# Patient Record
Sex: MALE | Race: WHITE | NOT HISPANIC OR LATINO | Employment: PART TIME | URBAN - METROPOLITAN AREA
[De-identification: names, ages, dates, MRNs, and addresses within clinical notes are randomized per-mention and may not be internally consistent; named-entity substitution may affect disease eponyms.]

---

## 2018-05-21 ENCOUNTER — CONVERSION ENCOUNTER (OUTPATIENT)
Dept: CARDIOLOGY | Facility: CLINIC | Age: 53
End: 2018-05-21

## 2019-06-04 VITALS
BODY MASS INDEX: 28.31 KG/M2 | OXYGEN SATURATION: 98 % | HEIGHT: 72 IN | HEART RATE: 56 BPM | WEIGHT: 209 LBS | DIASTOLIC BLOOD PRESSURE: 77 MMHG | SYSTOLIC BLOOD PRESSURE: 122 MMHG

## 2019-11-11 RX ORDER — ISOSORBIDE MONONITRATE 60 MG/1
TABLET, EXTENDED RELEASE ORAL
Qty: 30 TABLET | Refills: 0 | Status: SHIPPED | OUTPATIENT
Start: 2019-11-11 | End: 2019-12-11 | Stop reason: SDUPTHER

## 2019-12-11 RX ORDER — ISOSORBIDE MONONITRATE 60 MG/1
TABLET, EXTENDED RELEASE ORAL
Qty: 30 TABLET | Refills: 0 | Status: SHIPPED | OUTPATIENT
Start: 2019-12-11 | End: 2020-01-09

## 2019-12-18 RX ORDER — FOLIC ACID 0.8 MG
TABLET ORAL
COMMUNITY
Start: 2018-05-21

## 2019-12-18 RX ORDER — CLOPIDOGREL BISULFATE 75 MG/1
TABLET ORAL EVERY 24 HOURS
COMMUNITY
Start: 2018-05-16 | End: 2020-06-23 | Stop reason: SDUPTHER

## 2019-12-18 RX ORDER — VARENICLINE TARTRATE 1 MG/1
TABLET, FILM COATED ORAL
COMMUNITY
Start: 2018-05-21 | End: 2020-11-09

## 2019-12-18 RX ORDER — LISINOPRIL 10 MG/1
TABLET ORAL EVERY 24 HOURS
COMMUNITY
Start: 2018-05-16 | End: 2019-12-20

## 2019-12-18 RX ORDER — ASPIRIN 325 MG
TABLET ORAL EVERY 24 HOURS
COMMUNITY
Start: 2018-05-16 | End: 2021-11-11

## 2019-12-18 RX ORDER — ATORVASTATIN CALCIUM 10 MG/1
TABLET, FILM COATED ORAL EVERY 24 HOURS
Status: ON HOLD | COMMUNITY
Start: 2018-05-16 | End: 2020-02-26 | Stop reason: SDUPTHER

## 2019-12-20 ENCOUNTER — TELEPHONE (OUTPATIENT)
Dept: CARDIOLOGY | Facility: CLINIC | Age: 54
End: 2019-12-20

## 2019-12-20 ENCOUNTER — OFFICE VISIT (OUTPATIENT)
Dept: CARDIOLOGY | Facility: CLINIC | Age: 54
End: 2019-12-20

## 2019-12-20 VITALS
BODY MASS INDEX: 29.93 KG/M2 | SYSTOLIC BLOOD PRESSURE: 153 MMHG | DIASTOLIC BLOOD PRESSURE: 87 MMHG | OXYGEN SATURATION: 97 % | HEART RATE: 55 BPM | HEIGHT: 72 IN | WEIGHT: 221 LBS

## 2019-12-20 DIAGNOSIS — E78.5 DYSLIPIDEMIA: ICD-10-CM

## 2019-12-20 DIAGNOSIS — I10 ESSENTIAL HYPERTENSION: ICD-10-CM

## 2019-12-20 DIAGNOSIS — R07.2 PRECORDIAL PAIN: Primary | ICD-10-CM

## 2019-12-20 DIAGNOSIS — E11.9 TYPE 2 DIABETES MELLITUS WITHOUT COMPLICATION, WITHOUT LONG-TERM CURRENT USE OF INSULIN (HCC): ICD-10-CM

## 2019-12-20 DIAGNOSIS — Z98.61 CAD S/P PERCUTANEOUS CORONARY ANGIOPLASTY: ICD-10-CM

## 2019-12-20 DIAGNOSIS — I73.9 PVD (PERIPHERAL VASCULAR DISEASE) WITH CLAUDICATION (HCC): ICD-10-CM

## 2019-12-20 DIAGNOSIS — I25.10 CAD S/P PERCUTANEOUS CORONARY ANGIOPLASTY: ICD-10-CM

## 2019-12-20 PROCEDURE — 93000 ELECTROCARDIOGRAM COMPLETE: CPT | Performed by: INTERNAL MEDICINE

## 2019-12-20 PROCEDURE — 99214 OFFICE O/P EST MOD 30 MIN: CPT | Performed by: INTERNAL MEDICINE

## 2019-12-20 RX ORDER — LISINOPRIL 20 MG/1
20 TABLET ORAL DAILY
COMMUNITY
Start: 2019-12-02

## 2020-01-08 ENCOUNTER — TELEPHONE (OUTPATIENT)
Dept: CARDIOLOGY | Facility: HOSPITAL | Age: 55
End: 2020-01-08

## 2020-01-09 DIAGNOSIS — I10 ESSENTIAL HYPERTENSION: ICD-10-CM

## 2020-01-09 DIAGNOSIS — Z98.61 CAD S/P PERCUTANEOUS CORONARY ANGIOPLASTY: ICD-10-CM

## 2020-01-09 DIAGNOSIS — R07.2 PRECORDIAL PAIN: Primary | ICD-10-CM

## 2020-01-09 DIAGNOSIS — E11.9 TYPE 2 DIABETES MELLITUS WITHOUT COMPLICATION, WITHOUT LONG-TERM CURRENT USE OF INSULIN (HCC): ICD-10-CM

## 2020-01-09 DIAGNOSIS — I25.10 CAD S/P PERCUTANEOUS CORONARY ANGIOPLASTY: ICD-10-CM

## 2020-01-09 DIAGNOSIS — E78.5 DYSLIPIDEMIA: ICD-10-CM

## 2020-01-09 RX ORDER — ISOSORBIDE MONONITRATE 60 MG/1
TABLET, EXTENDED RELEASE ORAL
Qty: 90 TABLET | Refills: 1 | Status: SHIPPED | OUTPATIENT
Start: 2020-01-09 | End: 2020-07-02

## 2020-01-09 NOTE — TELEPHONE ENCOUNTER
Patient has known coronary artery disease and history of heart attack and stents and is a diabetic continues to smoke has hypertension and dyslipidemia is complaining of chest pain and leg cramps needs to have stress test if his insurance does not approve it need to appeal it.  Discussed this with the

## 2020-01-10 ENCOUNTER — APPOINTMENT (OUTPATIENT)
Dept: CARDIOLOGY | Facility: HOSPITAL | Age: 55
End: 2020-01-10

## 2020-01-21 ENCOUNTER — OFFICE VISIT (OUTPATIENT)
Dept: CARDIOLOGY | Facility: CLINIC | Age: 55
End: 2020-01-21

## 2020-01-21 ENCOUNTER — HOSPITAL ENCOUNTER (OUTPATIENT)
Dept: CARDIOLOGY | Facility: HOSPITAL | Age: 55
Discharge: HOME | End: 2020-01-21
Admitting: INTERNAL MEDICINE

## 2020-01-21 VITALS
HEIGHT: 72 IN | DIASTOLIC BLOOD PRESSURE: 90 MMHG | HEART RATE: 59 BPM | BODY MASS INDEX: 29.93 KG/M2 | SYSTOLIC BLOOD PRESSURE: 134 MMHG | WEIGHT: 221 LBS

## 2020-01-21 DIAGNOSIS — I73.9 PVD (PERIPHERAL VASCULAR DISEASE) WITH CLAUDICATION (HCC): ICD-10-CM

## 2020-01-21 DIAGNOSIS — E11.9 TYPE 2 DIABETES MELLITUS WITHOUT COMPLICATION, WITHOUT LONG-TERM CURRENT USE OF INSULIN (HCC): ICD-10-CM

## 2020-01-21 DIAGNOSIS — R94.39 ABNORMAL STRESS ELECTROCARDIOGRAM TEST USING TREADMILL: ICD-10-CM

## 2020-01-21 DIAGNOSIS — Z98.61 CAD S/P PERCUTANEOUS CORONARY ANGIOPLASTY: ICD-10-CM

## 2020-01-21 DIAGNOSIS — I25.10 CAD S/P PERCUTANEOUS CORONARY ANGIOPLASTY: ICD-10-CM

## 2020-01-21 DIAGNOSIS — E78.5 DYSLIPIDEMIA: ICD-10-CM

## 2020-01-21 DIAGNOSIS — I10 ESSENTIAL HYPERTENSION: ICD-10-CM

## 2020-01-21 DIAGNOSIS — I20.0 UNSTABLE ANGINA PECTORIS (HCC): Primary | ICD-10-CM

## 2020-01-21 DIAGNOSIS — R07.2 PRECORDIAL PAIN: ICD-10-CM

## 2020-01-21 LAB
BH CV STRESS BP STAGE 1: NORMAL
BH CV STRESS BP STAGE 2: NORMAL
BH CV STRESS BP STAGE 3: NORMAL
BH CV STRESS DURATION MIN STAGE 1: 3
BH CV STRESS DURATION MIN STAGE 2: 3
BH CV STRESS DURATION MIN STAGE 3: 1
BH CV STRESS DURATION SEC STAGE 1: 0
BH CV STRESS DURATION SEC STAGE 2: 0
BH CV STRESS DURATION SEC STAGE 3: 6
BH CV STRESS GRADE STAGE 1: 10
BH CV STRESS GRADE STAGE 2: 12
BH CV STRESS GRADE STAGE 3: 14
BH CV STRESS HR STAGE 1: 96
BH CV STRESS HR STAGE 2: 109
BH CV STRESS HR STAGE 3: 120
BH CV STRESS METS STAGE 1: 5
BH CV STRESS METS STAGE 2: 7.5
BH CV STRESS METS STAGE 3: 10
BH CV STRESS PROTOCOL 1: NORMAL
BH CV STRESS RECOVERY BP: NORMAL MMHG
BH CV STRESS RECOVERY HR: 88 BPM
BH CV STRESS SPEED STAGE 1: 1.7
BH CV STRESS SPEED STAGE 2: 2.5
BH CV STRESS SPEED STAGE 3: 3.4
BH CV STRESS STAGE 1: 1
BH CV STRESS STAGE 2: 2
BH CV STRESS STAGE 3: 3
MAXIMAL PREDICTED HEART RATE: 166 BPM
PERCENT MAX PREDICTED HR: 72.29 %
STRESS BASELINE BP: NORMAL MMHG
STRESS BASELINE HR: 59 BPM
STRESS PERCENT HR: 85 %
STRESS POST ESTIMATED WORKLOAD: 7 METS
STRESS POST EXERCISE DUR MIN: 7 MIN
STRESS POST EXERCISE DUR SEC: 6 SEC
STRESS POST PEAK BP: NORMAL MMHG
STRESS POST PEAK HR: 120 BPM
STRESS TARGET HR: 141 BPM

## 2020-01-21 PROCEDURE — 93016 CV STRESS TEST SUPVJ ONLY: CPT | Performed by: NURSE PRACTITIONER

## 2020-01-21 PROCEDURE — 99213 OFFICE O/P EST LOW 20 MIN: CPT | Performed by: INTERNAL MEDICINE

## 2020-01-21 PROCEDURE — 93017 CV STRESS TEST TRACING ONLY: CPT

## 2020-01-21 PROCEDURE — 93018 CV STRESS TEST I&R ONLY: CPT | Performed by: INTERNAL MEDICINE

## 2020-01-21 NOTE — PROGRESS NOTES
Subjective:     Encounter Date:01/21/2020      Patient ID: Reid Cartwright is a 54 y.o. male.    Chief Complaint : Stress test follow-up  History of Present Illness      This is a 54-year-old with PMH  of    #  CAD,NSTEMI, cardiac cath 05/03/2018 revealed subtotally occluded RCA which was subjected to drug eluting stent, has normal LVEF  # hypertension  # type 2 diabetes  # allergy/intolerance to Phenergan  #   former smoker,  quit 05/2018  # cholecystectomy which was complicated requiring bowel resection  # testicular surgery, right inguinal lymph node resection     here for stress test follow-up.  patient is complaining of recurrent left-sided chest pain which he has 2 cramp on the chest for it to go away, has some exertional component,denies any shortness of breath, lightheadedness dizziness loss of consciousness.  Patient is complaining of leg CRAMPS, sometimes at rest sometimes after exercise. patient  has diabetes denies any hypoglycemia episodes.  Has ups and Downs with  blood sugars. patient's arterial blood pressure is 134/90, heart rate 59/min  Patient had stents done 5/3/2018 and followed up 5/21/2018 has not been compliant with follow-ups due to insurance.  Since last labs from 5/4/2018 reveal cholesterol with total cholesterol 136, triglycerides of 65, HDL low at 37, LDL 86  Patient underwent treadmill stress test today where he walked 7 minutes but could not achieve his heart rate which went only up to 124 bpm which is 75% of age-predicted maximum had to terminate stress test due to shortness of breath    ASSESSMENT:  #  chest pain probably due to myocardial ischemia, unstable angina, abnormal treadmill stress test  #Leg cramps, claudication PVD  # CAD,NSTEMI,  PCI  # hypertension  #Dyslipidemia  # diabetes  # cigarette smoke    PLAN:  Reviewed this test results with patient  We will schedule myocardial perfusion imaging, as patient could not complete treadmill stress test  Follow up ABIs  Continue  statins metoprolol, isosorbide aspirin as tolerated  Counseled on smoking cessation   reviewed bradycardia with patient  Advised patient to follow-up with PMD for diabetes  Counseled on importance of compliance and follow-up        Assessment:          Diagnosis Plan   1. Unstable angina pectoris (CMS/HCC)  Stress Test With Myocardial Perfusion One Day   2. CAD S/P percutaneous coronary angioplasty  Stress Test With Myocardial Perfusion One Day   3. Essential hypertension  Stress Test With Myocardial Perfusion One Day   4. Dyslipidemia  Stress Test With Myocardial Perfusion One Day   5. Type 2 diabetes mellitus without complication, without long-term current use of insulin (CMS/HCC)  Stress Test With Myocardial Perfusion One Day   6. PVD (peripheral vascular disease) with claudication (CMS/HCC)  Stress Test With Myocardial Perfusion One Day   7. Abnormal stress electrocardiogram test using treadmill  Stress Test With Myocardial Perfusion One Day          Plan:         Past Medical History:  Past Medical History:   Diagnosis Date   • Coronary artery disease    • Diabetes mellitus (CMS/HCC)    • Hypertension      Past Surgical History:  Past Surgical History:   Procedure Laterality Date   • CARDIAC CATHETERIZATION        Allergies:  Allergies   Allergen Reactions   • Phenergan  [Promethazine Hcl] GI Intolerance     Home Meds:  Current Meds:     Current Outpatient Medications:   •  aspirin 325 MG tablet, Daily., Disp: , Rfl:   •  atorvastatin (LIPITOR) 10 MG tablet, Daily., Disp: , Rfl:   •  clopidogrel (PLAVIX) 75 MG tablet, Daily., Disp: , Rfl:   •  isosorbide mononitrate (IMDUR) 60 MG 24 hr tablet, TAKE 1 TABLET BY MOUTH EVERY DAY, Disp: 90 tablet, Rfl: 1  •  lisinopril (PRINIVIL,ZESTRIL) 20 MG tablet, , Disp: , Rfl:   •  Magnesium 500 MG capsule, MAGNESIUM 500 MG CAPS, Disp: , Rfl:   •  metFORMIN (GLUCOPHAGE) 1000 MG tablet, , Disp: , Rfl:   •  metoprolol tartrate (LOPRESSOR) 25 MG tablet, METOPROLOL TARTRATE 25 MG  "TABS, Disp: , Rfl:   •  varenicline (CHANTIX CONTINUING MONTH DANIELA) 1 MG tablet, CHANTIX CONTINUING MONTH DANIELA 1 MG TABS, Disp: , Rfl:   Social History:   Social History     Tobacco Use   • Smoking status: Former Smoker   • Smokeless tobacco: Never Used   Substance Use Topics   • Alcohol use: Not on file      Family History:  History reviewed. No pertinent family history.     The following portions of the patient's history were reviewed and updated as appropriate: allergies, current medications, past family history, past medical history, past social history, past surgical history and problem list.    Review of Systems   Cardiovascular: Positive for chest pain. Negative for leg swelling and palpitations.   Respiratory: Positive for shortness of breath.    Neurological: Positive for dizziness. Negative for numbness.     Comprehensive review of systems were reviewed and all others review of systems were found to be negative other than HPI    Procedures       Objective:     Physical Exam  /90 (BP Location: Left arm, Patient Position: Sitting, Cuff Size: Large Adult)   Pulse 59   Ht 182.9 cm (72\")   Wt 100 kg (221 lb)   BMI 29.97 kg/m²   General:  Appears in no acute distress  Eyes: Sclera is anicteric,  conjunctiva is clear   HEENT:  No JVD. Thyroid not visibly enlarged. No mucosal pallor or cyanosis  Respiratory: Respirations regular and unlabored at rest.  Bilaterally good breath sounds, with good air entry in all fields. No crackles, rubs or wheezes auscultated  Cardiovascular: S1,S2 Regular rate and rhythm. No murmur, rub or gallop auscultated. No pretibial pitting edema  Gastrointestinal: Abdomen soft, flat, non tender. Bowel sounds present.   Musculoskeletal:  No abnormal movements  Extremities: No digital clubbing or cyanosis  Skin: Color pink. Skin warm and dry to touch. No rashes  No xanthoma  Neuro: Alert and awake, no lateralizing deficits appreciated    Lab Reviewed:                  "

## 2020-01-22 ENCOUNTER — TELEPHONE (OUTPATIENT)
Dept: CARDIOLOGY | Facility: CLINIC | Age: 55
End: 2020-01-22

## 2020-02-07 ENCOUNTER — HOSPITAL ENCOUNTER (OUTPATIENT)
Dept: CARDIOLOGY | Facility: HOSPITAL | Age: 55
Discharge: HOME | End: 2020-02-07

## 2020-02-07 DIAGNOSIS — I20.0 UNSTABLE ANGINA PECTORIS (HCC): ICD-10-CM

## 2020-02-07 DIAGNOSIS — I25.10 CAD S/P PERCUTANEOUS CORONARY ANGIOPLASTY: ICD-10-CM

## 2020-02-07 DIAGNOSIS — I73.9 PVD (PERIPHERAL VASCULAR DISEASE) WITH CLAUDICATION (HCC): ICD-10-CM

## 2020-02-07 DIAGNOSIS — R94.39 ABNORMAL STRESS ELECTROCARDIOGRAM TEST USING TREADMILL: ICD-10-CM

## 2020-02-07 DIAGNOSIS — E11.9 TYPE 2 DIABETES MELLITUS WITHOUT COMPLICATION, WITHOUT LONG-TERM CURRENT USE OF INSULIN (HCC): ICD-10-CM

## 2020-02-07 DIAGNOSIS — E78.5 DYSLIPIDEMIA: ICD-10-CM

## 2020-02-07 DIAGNOSIS — Z98.61 CAD S/P PERCUTANEOUS CORONARY ANGIOPLASTY: ICD-10-CM

## 2020-02-07 DIAGNOSIS — I10 ESSENTIAL HYPERTENSION: ICD-10-CM

## 2020-02-07 PROCEDURE — 0 TECHNETIUM SESTAMIBI: Performed by: INTERNAL MEDICINE

## 2020-02-07 PROCEDURE — 93016 CV STRESS TEST SUPVJ ONLY: CPT | Performed by: INTERNAL MEDICINE

## 2020-02-07 PROCEDURE — 93017 CV STRESS TEST TRACING ONLY: CPT

## 2020-02-07 PROCEDURE — A9500 TC99M SESTAMIBI: HCPCS | Performed by: INTERNAL MEDICINE

## 2020-02-07 PROCEDURE — 78452 HT MUSCLE IMAGE SPECT MULT: CPT

## 2020-02-07 PROCEDURE — 25010000002 REGADENOSON 0.4 MG/5ML SOLUTION: Performed by: INTERNAL MEDICINE

## 2020-02-07 RX ADMIN — TECHNETIUM TC 99M SESTAMIBI 1 DOSE: 1 INJECTION INTRAVENOUS at 07:55

## 2020-02-07 RX ADMIN — REGADENOSON 0.4 MG: 0.08 INJECTION, SOLUTION INTRAVENOUS at 10:00

## 2020-02-07 RX ADMIN — TECHNETIUM TC 99M SESTAMIBI 1 DOSE: 1 INJECTION INTRAVENOUS at 10:00

## 2020-02-12 LAB
BH CV NUCLEAR PRIOR STUDY: 3
BH CV STRESS BP STAGE 1: NORMAL
BH CV STRESS BP STAGE 2: NORMAL
BH CV STRESS COMMENTS STAGE 1: NORMAL
BH CV STRESS COMMENTS STAGE 2: NORMAL
BH CV STRESS DOSE REGADENOSON STAGE 1: 0.4
BH CV STRESS DURATION MIN STAGE 1: 0
BH CV STRESS DURATION MIN STAGE 2: 4
BH CV STRESS DURATION SEC STAGE 1: 10
BH CV STRESS DURATION SEC STAGE 2: 0
BH CV STRESS HR STAGE 1: 55
BH CV STRESS HR STAGE 2: 86
BH CV STRESS PROTOCOL 1: NORMAL
BH CV STRESS RECOVERY BP: NORMAL MMHG
BH CV STRESS RECOVERY HR: 77 BPM
BH CV STRESS STAGE 1: 1
BH CV STRESS STAGE 2: 2
MAXIMAL PREDICTED HEART RATE: 166 BPM
PERCENT MAX PREDICTED HR: 51.81 %
STRESS BASELINE BP: NORMAL MMHG
STRESS BASELINE HR: 55 BPM
STRESS PERCENT HR: 61 %
STRESS POST PEAK BP: NORMAL MMHG
STRESS POST PEAK HR: 86 BPM
STRESS TARGET HR: 141 BPM

## 2020-02-12 PROCEDURE — 78452 HT MUSCLE IMAGE SPECT MULT: CPT | Performed by: INTERNAL MEDICINE

## 2020-02-12 PROCEDURE — 93018 CV STRESS TEST I&R ONLY: CPT | Performed by: INTERNAL MEDICINE

## 2020-02-13 DIAGNOSIS — Z98.61 CAD S/P PERCUTANEOUS CORONARY ANGIOPLASTY: ICD-10-CM

## 2020-02-13 DIAGNOSIS — E11.9 TYPE 2 DIABETES MELLITUS WITHOUT COMPLICATION, WITHOUT LONG-TERM CURRENT USE OF INSULIN (HCC): ICD-10-CM

## 2020-02-13 DIAGNOSIS — E78.5 DYSLIPIDEMIA: ICD-10-CM

## 2020-02-13 DIAGNOSIS — I20.0 UNSTABLE ANGINA PECTORIS (HCC): Primary | ICD-10-CM

## 2020-02-13 DIAGNOSIS — I25.10 CAD S/P PERCUTANEOUS CORONARY ANGIOPLASTY: ICD-10-CM

## 2020-02-13 DIAGNOSIS — R94.39 ABNORMAL NUCLEAR STRESS TEST: ICD-10-CM

## 2020-02-13 DIAGNOSIS — I10 ESSENTIAL HYPERTENSION: ICD-10-CM

## 2020-02-18 PROBLEM — E78.5 DYSLIPIDEMIA: Status: ACTIVE | Noted: 2020-02-18

## 2020-02-18 PROBLEM — I20.0 UNSTABLE ANGINA PECTORIS (HCC): Status: ACTIVE | Noted: 2020-02-18

## 2020-02-18 PROBLEM — R94.39 ABNORMAL NUCLEAR STRESS TEST: Status: ACTIVE | Noted: 2020-02-18

## 2020-02-18 PROBLEM — Z98.61 CAD S/P PERCUTANEOUS CORONARY ANGIOPLASTY: Status: ACTIVE | Noted: 2020-02-18

## 2020-02-18 PROBLEM — I10 ESSENTIAL HYPERTENSION: Status: ACTIVE | Noted: 2020-02-18

## 2020-02-18 PROBLEM — I25.10 CAD S/P PERCUTANEOUS CORONARY ANGIOPLASTY: Status: ACTIVE | Noted: 2020-02-18

## 2020-02-18 PROBLEM — E11.9 TYPE 2 DIABETES MELLITUS WITHOUT COMPLICATION, WITHOUT LONG-TERM CURRENT USE OF INSULIN (HCC): Status: ACTIVE | Noted: 2020-02-18

## 2020-02-25 ENCOUNTER — LAB (OUTPATIENT)
Dept: LAB | Facility: HOSPITAL | Age: 55
End: 2020-02-25

## 2020-02-25 ENCOUNTER — HOSPITAL ENCOUNTER (OUTPATIENT)
Dept: GENERAL RADIOLOGY | Facility: HOSPITAL | Age: 55
Discharge: HOME | End: 2020-02-25
Admitting: INTERNAL MEDICINE

## 2020-02-25 DIAGNOSIS — E78.5 DYSLIPIDEMIA: ICD-10-CM

## 2020-02-25 DIAGNOSIS — E11.9 TYPE 2 DIABETES MELLITUS WITHOUT COMPLICATION, WITHOUT LONG-TERM CURRENT USE OF INSULIN (HCC): ICD-10-CM

## 2020-02-25 DIAGNOSIS — I10 ESSENTIAL HYPERTENSION: ICD-10-CM

## 2020-02-25 DIAGNOSIS — I25.10 CAD S/P PERCUTANEOUS CORONARY ANGIOPLASTY: ICD-10-CM

## 2020-02-25 DIAGNOSIS — Z98.61 CAD S/P PERCUTANEOUS CORONARY ANGIOPLASTY: ICD-10-CM

## 2020-02-25 DIAGNOSIS — I20.0 UNSTABLE ANGINA PECTORIS (HCC): ICD-10-CM

## 2020-02-25 DIAGNOSIS — R94.39 ABNORMAL NUCLEAR STRESS TEST: ICD-10-CM

## 2020-02-25 DIAGNOSIS — R07.2 PRECORDIAL PAIN: ICD-10-CM

## 2020-02-25 DIAGNOSIS — I73.9 PVD (PERIPHERAL VASCULAR DISEASE) WITH CLAUDICATION (HCC): ICD-10-CM

## 2020-02-25 LAB
ALBUMIN SERPL-MCNC: 4.2 G/DL (ref 3.5–5.2)
ALBUMIN/GLOB SERPL: 2 G/DL
ALP SERPL-CCNC: 59 U/L (ref 39–117)
ALT SERPL W P-5'-P-CCNC: 21 U/L (ref 1–41)
ANION GAP SERPL CALCULATED.3IONS-SCNC: 12.4 MMOL/L (ref 5–15)
AST SERPL-CCNC: 20 U/L (ref 1–40)
BILIRUB SERPL-MCNC: 0.3 MG/DL (ref 0.2–1.2)
BUN BLD-MCNC: 21 MG/DL (ref 6–20)
BUN/CREAT SERPL: 17.1 (ref 7–25)
CALCIUM SPEC-SCNC: 9.4 MG/DL (ref 8.6–10.5)
CHLORIDE SERPL-SCNC: 103 MMOL/L (ref 98–107)
CHOLEST SERPL-MCNC: 151 MG/DL (ref 0–200)
CK SERPL-CCNC: 119 U/L (ref 20–200)
CO2 SERPL-SCNC: 24.6 MMOL/L (ref 22–29)
CREAT BLD-MCNC: 1.23 MG/DL (ref 0.76–1.27)
DEPRECATED RDW RBC AUTO: 42.2 FL (ref 37–54)
ERYTHROCYTE [DISTWIDTH] IN BLOOD BY AUTOMATED COUNT: 12.9 % (ref 12.3–15.4)
GFR SERPL CREATININE-BSD FRML MDRD: 61 ML/MIN/1.73
GLOBULIN UR ELPH-MCNC: 2.1 GM/DL
GLUCOSE BLD-MCNC: 149 MG/DL (ref 65–99)
HCT VFR BLD AUTO: 45.7 % (ref 37.5–51)
HDLC SERPL-MCNC: 38 MG/DL (ref 40–60)
HGB BLD-MCNC: 15 G/DL (ref 13–17.7)
LDLC SERPL CALC-MCNC: 92 MG/DL (ref 0–100)
LDLC/HDLC SERPL: 2.41 {RATIO}
MCH RBC QN AUTO: 29.9 PG (ref 26.6–33)
MCHC RBC AUTO-ENTMCNC: 32.8 G/DL (ref 31.5–35.7)
MCV RBC AUTO: 91 FL (ref 79–97)
PLATELET # BLD AUTO: 273 10*3/MM3 (ref 140–450)
PMV BLD AUTO: 9.4 FL (ref 6–12)
POTASSIUM BLD-SCNC: 4.6 MMOL/L (ref 3.5–5.2)
PROT SERPL-MCNC: 6.3 G/DL (ref 6–8.5)
RBC # BLD AUTO: 5.02 10*6/MM3 (ref 4.14–5.8)
SODIUM BLD-SCNC: 140 MMOL/L (ref 136–145)
TRIGL SERPL-MCNC: 107 MG/DL (ref 0–150)
VLDLC SERPL-MCNC: 21.4 MG/DL (ref 5–40)
WBC NRBC COR # BLD: 7.93 10*3/MM3 (ref 3.4–10.8)

## 2020-02-25 PROCEDURE — 71046 X-RAY EXAM CHEST 2 VIEWS: CPT

## 2020-02-25 PROCEDURE — 36415 COLL VENOUS BLD VENIPUNCTURE: CPT

## 2020-02-25 PROCEDURE — 85027 COMPLETE CBC AUTOMATED: CPT

## 2020-02-25 PROCEDURE — 82550 ASSAY OF CK (CPK): CPT

## 2020-02-25 PROCEDURE — 80053 COMPREHEN METABOLIC PANEL: CPT

## 2020-02-25 PROCEDURE — 80061 LIPID PANEL: CPT

## 2020-02-26 ENCOUNTER — HOSPITAL ENCOUNTER (OUTPATIENT)
Facility: HOSPITAL | Age: 55
Setting detail: HOSPITAL OUTPATIENT SURGERY
Discharge: HOME | End: 2020-02-26
Attending: INTERNAL MEDICINE | Admitting: INTERNAL MEDICINE

## 2020-02-26 VITALS
HEIGHT: 72 IN | TEMPERATURE: 97.5 F | SYSTOLIC BLOOD PRESSURE: 176 MMHG | RESPIRATION RATE: 18 BRPM | HEART RATE: 60 BPM | OXYGEN SATURATION: 98 % | BODY MASS INDEX: 29.89 KG/M2 | WEIGHT: 220.68 LBS | DIASTOLIC BLOOD PRESSURE: 84 MMHG

## 2020-02-26 DIAGNOSIS — E78.5 DYSLIPIDEMIA: ICD-10-CM

## 2020-02-26 DIAGNOSIS — I20.0 UNSTABLE ANGINA PECTORIS (HCC): ICD-10-CM

## 2020-02-26 DIAGNOSIS — Z98.61 CAD S/P PERCUTANEOUS CORONARY ANGIOPLASTY: ICD-10-CM

## 2020-02-26 DIAGNOSIS — I25.10 CAD S/P PERCUTANEOUS CORONARY ANGIOPLASTY: ICD-10-CM

## 2020-02-26 DIAGNOSIS — E11.9 TYPE 2 DIABETES MELLITUS WITHOUT COMPLICATION, WITHOUT LONG-TERM CURRENT USE OF INSULIN (HCC): ICD-10-CM

## 2020-02-26 DIAGNOSIS — I10 ESSENTIAL HYPERTENSION: ICD-10-CM

## 2020-02-26 DIAGNOSIS — R94.39 ABNORMAL NUCLEAR STRESS TEST: ICD-10-CM

## 2020-02-26 LAB — GLUCOSE BLDC GLUCOMTR-MCNC: 129 MG/DL (ref 70–105)

## 2020-02-26 PROCEDURE — 93458 L HRT ARTERY/VENTRICLE ANGIO: CPT | Performed by: INTERNAL MEDICINE

## 2020-02-26 PROCEDURE — C1894 INTRO/SHEATH, NON-LASER: HCPCS | Performed by: INTERNAL MEDICINE

## 2020-02-26 PROCEDURE — 82962 GLUCOSE BLOOD TEST: CPT

## 2020-02-26 PROCEDURE — C1760 CLOSURE DEV, VASC: HCPCS | Performed by: INTERNAL MEDICINE

## 2020-02-26 PROCEDURE — 25010000002 FENTANYL CITRATE (PF) 100 MCG/2ML SOLUTION: Performed by: INTERNAL MEDICINE

## 2020-02-26 PROCEDURE — 0 IOPAMIDOL PER 1 ML: Performed by: INTERNAL MEDICINE

## 2020-02-26 PROCEDURE — 25010000002 MIDAZOLAM PER 1 MG: Performed by: INTERNAL MEDICINE

## 2020-02-26 PROCEDURE — C1769 GUIDE WIRE: HCPCS | Performed by: INTERNAL MEDICINE

## 2020-02-26 RX ORDER — BISACODYL 5 MG/1
5 TABLET, DELAYED RELEASE ORAL DAILY PRN
Status: DISCONTINUED | OUTPATIENT
Start: 2020-02-26 | End: 2020-02-26 | Stop reason: HOSPADM

## 2020-02-26 RX ORDER — AMOXICILLIN 250 MG
2 CAPSULE ORAL 2 TIMES DAILY
Status: DISCONTINUED | OUTPATIENT
Start: 2020-02-26 | End: 2020-02-26 | Stop reason: HOSPADM

## 2020-02-26 RX ORDER — ACETAMINOPHEN 325 MG/1
650 TABLET ORAL EVERY 4 HOURS PRN
Status: DISCONTINUED | OUTPATIENT
Start: 2020-02-26 | End: 2020-02-26 | Stop reason: HOSPADM

## 2020-02-26 RX ORDER — SODIUM CHLORIDE 9 MG/ML
30 INJECTION, SOLUTION INTRAVENOUS CONTINUOUS
Status: DISCONTINUED | OUTPATIENT
Start: 2020-02-26 | End: 2020-02-26 | Stop reason: HOSPADM

## 2020-02-26 RX ORDER — BISACODYL 10 MG
10 SUPPOSITORY, RECTAL RECTAL DAILY PRN
Status: DISCONTINUED | OUTPATIENT
Start: 2020-02-26 | End: 2020-02-26 | Stop reason: HOSPADM

## 2020-02-26 RX ORDER — LIDOCAINE HYDROCHLORIDE 20 MG/ML
INJECTION, SOLUTION INFILTRATION; PERINEURAL AS NEEDED
Status: DISCONTINUED | OUTPATIENT
Start: 2020-02-26 | End: 2020-02-26 | Stop reason: HOSPADM

## 2020-02-26 RX ORDER — MIDAZOLAM HYDROCHLORIDE 1 MG/ML
INJECTION INTRAMUSCULAR; INTRAVENOUS AS NEEDED
Status: DISCONTINUED | OUTPATIENT
Start: 2020-02-26 | End: 2020-02-26 | Stop reason: HOSPADM

## 2020-02-26 RX ORDER — ATORVASTATIN CALCIUM 10 MG/1
40 TABLET, FILM COATED ORAL NIGHTLY
Qty: 90 TABLET | Refills: 3 | Status: SHIPPED | OUTPATIENT
Start: 2020-02-26 | End: 2020-02-26 | Stop reason: DRUGHIGH

## 2020-02-26 RX ORDER — FENTANYL CITRATE 50 UG/ML
INJECTION, SOLUTION INTRAMUSCULAR; INTRAVENOUS AS NEEDED
Status: DISCONTINUED | OUTPATIENT
Start: 2020-02-26 | End: 2020-02-26 | Stop reason: HOSPADM

## 2020-02-26 RX ORDER — SODIUM CHLORIDE 9 MG/ML
100 INJECTION, SOLUTION INTRAVENOUS CONTINUOUS
Status: DISCONTINUED | OUTPATIENT
Start: 2020-02-26 | End: 2020-02-26 | Stop reason: HOSPADM

## 2020-02-26 RX ORDER — ATORVASTATIN CALCIUM 40 MG/1
40 TABLET, FILM COATED ORAL DAILY
Qty: 90 TABLET | Refills: 1 | Status: SHIPPED | OUTPATIENT
Start: 2020-02-26 | End: 2020-08-27

## 2020-02-26 RX ADMIN — SODIUM CHLORIDE 30 ML/HR: 900 INJECTION, SOLUTION INTRAVENOUS at 08:47

## 2020-04-07 ENCOUNTER — TELEPHONE (OUTPATIENT)
Dept: CARDIOLOGY | Facility: CLINIC | Age: 55
End: 2020-04-07

## 2020-04-07 NOTE — TELEPHONE ENCOUNTER
Leg cramps are worse then before. He tried to go in store today and was not able. He is scheduled for video visit on 4/9

## 2020-04-08 RX ORDER — AMPICILLIN TRIHYDRATE 250 MG
CAPSULE ORAL DAILY
COMMUNITY
End: 2023-01-05

## 2020-04-08 NOTE — TELEPHONE ENCOUNTER
Called pt, he said he will try the CoQ10, if he still has cramps he will call back to schedule RAHUL.     Completed.

## 2020-04-09 ENCOUNTER — TELEMEDICINE (OUTPATIENT)
Dept: CARDIOLOGY | Facility: CLINIC | Age: 55
End: 2020-04-09

## 2020-04-09 VITALS — HEIGHT: 71 IN | WEIGHT: 220 LBS | BODY MASS INDEX: 30.8 KG/M2

## 2020-04-09 DIAGNOSIS — E11.9 TYPE 2 DIABETES MELLITUS WITHOUT COMPLICATION, WITHOUT LONG-TERM CURRENT USE OF INSULIN (HCC): ICD-10-CM

## 2020-04-09 DIAGNOSIS — E78.5 DYSLIPIDEMIA: ICD-10-CM

## 2020-04-09 DIAGNOSIS — Z98.61 CAD S/P PERCUTANEOUS CORONARY ANGIOPLASTY: ICD-10-CM

## 2020-04-09 DIAGNOSIS — I73.9 PVD (PERIPHERAL VASCULAR DISEASE) WITH CLAUDICATION (HCC): ICD-10-CM

## 2020-04-09 DIAGNOSIS — R25.2 LEG CRAMPS: Primary | ICD-10-CM

## 2020-04-09 DIAGNOSIS — I10 ESSENTIAL HYPERTENSION: ICD-10-CM

## 2020-04-09 DIAGNOSIS — I25.10 CAD S/P PERCUTANEOUS CORONARY ANGIOPLASTY: ICD-10-CM

## 2020-04-09 PROCEDURE — 99213 OFFICE O/P EST LOW 20 MIN: CPT | Performed by: INTERNAL MEDICINE

## 2020-04-09 NOTE — PROGRESS NOTES
You have chosen to receive care through a video visit today. Do you consent to use a video visit for your medical care today? Yes      Subjective:     Encounter Date:04/09/2020      Patient ID: Reid Cartwright is a 54 y.o. male.    Chief Complaint :  History of Present Illness      This is a 54-year-old with PMH  of    #  CAD,NSTEMI, cardiac cath 05/03/2018 revealed subtotally occluded RCA which was subjected to drug eluting stent, has normal LVEF.  Cardiac cath done 2/26/2020 revealed patent stent in mid RCA with nonocclusive thrombus in distal RCA and severe disease in large diagonal  # hypertension  # type 2 diabetes  # allergy/intolerance to Phenergan  #   former smoker,  quit 05/2018  # cholecystectomy which was complicated requiring bowel resection  # testicular surgery, right inguinal lymph node resection     here for cath follow-up.  Patient's visit is being done with video visit due to coronavirus pandemic and patient's underlying diabetes and heart disease.  Informed consent was obtained.  Patient is complaining of intermittent leg cramps with no aggravating or relieving factors.,denies any shortness of breath, lightheadedness dizziness loss of consciousness.  Patient is complaining of leg CRAMPS, sometimes at rest sometimes after exercise. patient  has diabetes denies any hypoglycemia episodes.  Has ups and Downs with  blood sugars.  Patient had stents done 5/3/2018 and followed up 5/21/2018 has not been compliant with follow-ups due to insurance.  Since last labs from 5/4/2018 reveal cholesterol with total cholesterol 136, triglycerides of 65, HDL low at 37, LDL 86  Patient underwent treadmill stress test today where he walked 7 minutes but could not achieve his heart rate which went only up to 124 bpm which is 75% of age-predicted maximum had to terminate stress test due to shortness of breath, underwent cardiac cath which revealed a patent stent in RCA with nonocclusive thrombus in distal RCA and severe  diagonal disease.    ASSESSMENT:    #Leg cramps, claudication PVD  # CAD,NSTEMI,  PCI  # hypertension  #Dyslipidemia  # diabetes  # cigarette smoke    PLAN:  Reviewed cath results with patient  Advise coenzyme Q for leg cramps  Continue statins metoprolol, isosorbide aspirin as tolerated  Counseled on smoking cessation   reviewed bradycardia with patient  Advised patient to follow-up with PMD for diabetes  Counseled on importance of compliance and follow-up          Assessment:          Diagnosis Plan   1. Leg cramps     2. CAD S/P percutaneous coronary angioplasty     3. Essential hypertension     4. Dyslipidemia     5. Type 2 diabetes mellitus without complication, without long-term current use of insulin (CMS/[facility])     6. PVD (peripheral vascular disease) with claudication (CMS/[facility])            Plan:         Past Medical History:  Past Medical History:   Diagnosis Date   • Coronary artery disease    • Diabetes mellitus (CMS/HCC)    • Hypertension      Past Surgical History:  Past Surgical History:   Procedure Laterality Date   • CARDIAC CATHETERIZATION     • CARDIAC CATHETERIZATION N/A 2/26/2020    Procedure: Left Heart Cath;  Surgeon: Brant Barker MD;  Location: [facility] CATH INVASIVE LOCATION;  Service: Cardiovascular;  Laterality: N/A;      Allergies:  Allergies   Allergen Reactions   • Phenergan  [Promethazine Hcl] GI Intolerance     Home Meds:  Current Meds:     Current Outpatient Medications:   •  Arginine 1000 MG tablet, Take 1 tablet by mouth 2 (Two) Times a Day., Disp: , Rfl:   •  aspirin 325 MG tablet, Daily., Disp: , Rfl:   •  atorvastatin (LIPITOR) 40 MG tablet, Take 1 tablet by mouth Daily., Disp: 90 tablet, Rfl: 1  •  clopidogrel (PLAVIX) 75 MG tablet, Daily., Disp: , Rfl:   •  Coenzyme Q10 (COQ10) 200 MG capsule, Take  by mouth Daily., Disp: , Rfl:   •  isosorbide mononitrate (IMDUR) 60 MG 24 hr tablet, TAKE 1 TABLET BY MOUTH EVERY DAY (Patient taking differently: Take 60 mg by mouth  "Daily.), Disp: 90 tablet, Rfl: 1  •  lisinopril (PRINIVIL,ZESTRIL) 20 MG tablet, 20 mg Daily., Disp: , Rfl:   •  Magnesium 500 MG capsule, MAGNESIUM 500 MG CAPS, Disp: , Rfl:   •  metFORMIN (GLUCOPHAGE) 1000 MG tablet, , Disp: , Rfl:   •  metoprolol tartrate (LOPRESSOR) 25 MG tablet, METOPROLOL TARTRATE 25 MG TABS, Disp: , Rfl:   •  varenicline (CHANTIX CONTINUING MONTH DANIELA) 1 MG tablet, CHANTIX CONTINUING MONTH DANIELA 1 MG TABS, Disp: , Rfl:   Social History:   Social History     Tobacco Use   • Smoking status: Former Smoker   • Smokeless tobacco: Never Used   Substance Use Topics   • Alcohol use: Not on file      Family History:  History reviewed. No pertinent family history.     The following portions of the patient's history were reviewed and updated as appropriate: allergies, current medications, past family history, past medical history, past social history, past surgical history and problem list.      Review of Systems   Cardiovascular: Negative for chest pain, leg swelling and palpitations.   Respiratory: Negative for shortness of breath.    Neurological: Negative for dizziness and numbness.     Comprehensive review of systems were reviewed and all others review of systems were found to be negative other than HPI    Procedures       Objective:     Physical Exam  Ht 180.3 cm (71\")   Wt 99.8 kg (220 lb)   BMI 30.68 kg/m²   General:  Appears in no acute distress  Eyes: Sclera is anicteric,  conjunctiva is clear   HEENT:  No JVD.   Respiratory: Respirations regular and unlabored at rest.    Musculoskeletal:  No abnormal movements  Extremities: No edema  Skin: Color pink. .  Neuro: Alert and awake.    Lab Reviewed:                  "

## 2020-06-23 ENCOUNTER — TELEPHONE (OUTPATIENT)
Dept: CARDIOLOGY | Facility: CLINIC | Age: 55
End: 2020-06-23

## 2020-06-23 RX ORDER — CLOPIDOGREL BISULFATE 75 MG/1
75 TABLET ORAL EVERY 24 HOURS
Qty: 90 TABLET | Refills: 1 | Status: SHIPPED | OUTPATIENT
Start: 2020-06-23 | End: 2020-09-30

## 2020-07-02 RX ORDER — ISOSORBIDE MONONITRATE 60 MG/1
TABLET, EXTENDED RELEASE ORAL
Qty: 90 TABLET | Refills: 1 | Status: SHIPPED | OUTPATIENT
Start: 2020-07-02 | End: 2020-12-16

## 2020-08-18 ENCOUNTER — TELEPHONE (OUTPATIENT)
Dept: CARDIOLOGY | Facility: CLINIC | Age: 55
End: 2020-08-18

## 2020-08-18 NOTE — TELEPHONE ENCOUNTER
IBIS JONES  PHONE [phone]  [phone]  EXCISION OF CYST OF RIGHT NECK  OK TO HOLD PLAVIX 5-7 DAYS PRIOR?  LOV 4/9/2020

## 2020-08-27 RX ORDER — ATORVASTATIN CALCIUM 40 MG/1
TABLET, FILM COATED ORAL
Qty: 90 TABLET | Refills: 1 | Status: SHIPPED | OUTPATIENT
Start: 2020-08-27 | End: 2021-02-19

## 2020-09-30 RX ORDER — CLOPIDOGREL BISULFATE 75 MG/1
TABLET ORAL
Qty: 90 TABLET | Refills: 1 | Status: SHIPPED | OUTPATIENT
Start: 2020-09-30 | End: 2021-06-16

## 2020-11-09 ENCOUNTER — OFFICE VISIT (OUTPATIENT)
Dept: CARDIOLOGY | Facility: CLINIC | Age: 55
End: 2020-11-09

## 2020-11-09 VITALS
HEART RATE: 70 BPM | HEIGHT: 72 IN | DIASTOLIC BLOOD PRESSURE: 82 MMHG | OXYGEN SATURATION: 97 % | WEIGHT: 222 LBS | SYSTOLIC BLOOD PRESSURE: 130 MMHG | BODY MASS INDEX: 30.07 KG/M2

## 2020-11-09 DIAGNOSIS — I25.10 CAD S/P PERCUTANEOUS CORONARY ANGIOPLASTY: Primary | ICD-10-CM

## 2020-11-09 DIAGNOSIS — I10 ESSENTIAL HYPERTENSION: ICD-10-CM

## 2020-11-09 DIAGNOSIS — I73.9 PVD (PERIPHERAL VASCULAR DISEASE) WITH CLAUDICATION (HCC): ICD-10-CM

## 2020-11-09 DIAGNOSIS — E11.9 TYPE 2 DIABETES MELLITUS WITHOUT COMPLICATION, WITHOUT LONG-TERM CURRENT USE OF INSULIN (HCC): ICD-10-CM

## 2020-11-09 DIAGNOSIS — E78.5 DYSLIPIDEMIA: ICD-10-CM

## 2020-11-09 DIAGNOSIS — Z98.61 CAD S/P PERCUTANEOUS CORONARY ANGIOPLASTY: Primary | ICD-10-CM

## 2020-11-09 PROCEDURE — 99214 OFFICE O/P EST MOD 30 MIN: CPT | Performed by: INTERNAL MEDICINE

## 2020-11-09 PROCEDURE — 93000 ELECTROCARDIOGRAM COMPLETE: CPT | Performed by: INTERNAL MEDICINE

## 2020-11-09 NOTE — PROGRESS NOTES
Subjective:     Encounter Date:11/09/2020      Patient ID: Reid Cartwright is a 54 y.o. male.    Chief Complaint : Here for follow-up for CAD, MI, PCI  History of Present Illness      This is a 54-year-old with PMH  of    #  CAD,NSTEMI, cardiac cath 05/03/2018 revealed subtotally occluded RCA which was subjected to drug eluting stent, has normal LVEF.  Cardiac cath done 2/26/2020 revealed patent stent in mid RCA with nonocclusive thrombus in distal RCA and severe disease in large diagonal  # hypertension  # type 2 diabetes  # allergy/intolerance to Phenergan  #   former smoker,  quit 05/2018  # cholecystectomy which was complicated requiring bowel resection  # testicular surgery, right inguinal lymph node resection     here for  follow-up.  Patient denies any chest pain shortness of breath palpitations lightheadedness dizziness loss of consciousness.  Patient reportedly had labs done with PMD.   Has ups and Downs with  blood sugars.  Patient had stents done 5/3/2018 and followed up 5/21/2018 has not been compliant with follow-ups due to insurance.Since last labs from 5/4/2018 reveal cholesterol with total cholesterol 136, triglycerides of 65, HDL low at 37, LDL 86.  Labs from 2/25/2020 reveal CMP with a BUN of 21 creatinine 1.23, cholesterol 151 triglycerides 107 HDL low at 38 LDL 92  Patient underwent treadmill stress test 1/21/20,  where he walked 7 minutes but could not achieve his heart rate which went only up to 124 bpm which is 75% of age-predicted maximum had to terminate stress test due to shortness of breath, underwent cardiac cath 2/26/20 which revealed a patent stent in RCA with nonocclusive thrombus in distal RCA and severe diagonal disease.    ASSESSMENT:    #  PVD  # CAD,NSTEMI,  PCI  # hypertension  #Dyslipidemia  # diabetes  # cigarette smoke    PLAN:    Reviewed EKG results with patient  Advise coenzyme Q for leg cramps  Continue statins metoprolol, isosorbide aspirin as tolerated  Will  discontinue clopidogrel since its been over a year since stenting.  Counseled on smoking cessation   reviewed bradycardia with patient  Advised patient to follow-up with PMD for diabetes  Counseled on importance of compliance and follow-up  Will get labs from PMDs office  Counseled on diet exercise      Assessment:          Diagnosis Plan   1. CAD S/P percutaneous coronary angioplasty     2. Essential hypertension     3. Dyslipidemia     4. Type 2 diabetes mellitus without complication, without long-term current use of insulin (CMS/HCC)     5. PVD (peripheral vascular disease) with claudication (CMS/HCC)            Plan:         Past Medical History:  Past Medical History:   Diagnosis Date   • Coronary artery disease    • Diabetes mellitus (CMS/HCC)    • Hypertension      Past Surgical History:  Past Surgical History:   Procedure Laterality Date   • CARDIAC CATHETERIZATION     • CARDIAC CATHETERIZATION N/A 2/26/2020    Procedure: Left Heart Cath;  Surgeon: Brant Barker MD;  Location: [facility] CATH INVASIVE LOCATION;  Service: Cardiovascular;  Laterality: N/A;      Allergies:  Allergies   Allergen Reactions   • Phenergan  [Promethazine Hcl] GI Intolerance     Home Meds:  Current Meds:     Current Outpatient Medications:   •  Arginine 1000 MG tablet, Take 1 tablet by mouth 2 (Two) Times a Day., Disp: , Rfl:   •  aspirin 325 MG tablet, Daily., Disp: , Rfl:   •  atorvastatin (LIPITOR) 40 MG tablet, TAKE 1 TABLET BY MOUTH EVERY DAY, Disp: 90 tablet, Rfl: 1  •  clopidogrel (PLAVIX) 75 MG tablet, TAKE 1 TABLET BY MOUTH EVERY DAY, Disp: 90 tablet, Rfl: 1  •  Coenzyme Q10 (COQ10) 200 MG capsule, Take  by mouth Daily., Disp: , Rfl:   •  isosorbide mononitrate (IMDUR) 60 MG 24 hr tablet, TAKE 1 TABLET BY MOUTH EVERY DAY, Disp: 90 tablet, Rfl: 1  •  lisinopril (PRINIVIL,ZESTRIL) 20 MG tablet, 20 mg Daily., Disp: , Rfl:   •  Magnesium 500 MG capsule, MAGNESIUM 500 MG CAPS, Disp: , Rfl:   •  metFORMIN (GLUCOPHAGE) 1000  MG tablet, 2 (Two) Times a Day With Meals., Disp: , Rfl:   •  metoprolol tartrate (LOPRESSOR) 25 MG tablet, METOPROLOL TARTRATE 25 MG TABS, Disp: , Rfl:   Social History:   Social History     Tobacco Use   • Smoking status: Former Smoker   • Smokeless tobacco: Never Used   Substance Use Topics   • Alcohol use: Not Currently     Frequency: Never     Comment: rare      Family History:  History reviewed. No pertinent family history.     The following portions of the patient's history were reviewed and updated as appropriate: allergies, current medications, past family history, past medical history, past social history, past surgical history and problem list.      Review of Systems   Constitution: Negative for fever and malaise/fatigue.   HENT: Negative for congestion and hearing loss.    Eyes: Negative for double vision and visual disturbance.   Cardiovascular: Negative for chest pain, claudication, dyspnea on exertion, leg swelling and syncope.   Respiratory: Negative for cough and shortness of breath.    Endocrine: Negative for cold intolerance.   Skin: Negative for color change and rash.   Musculoskeletal: Negative for arthritis and joint pain.   Gastrointestinal: Negative for abdominal pain and heartburn.   Genitourinary: Negative for hematuria.   Neurological: Positive for light-headedness. Negative for excessive daytime sleepiness and dizziness.   Psychiatric/Behavioral: Negative for depression. The patient is not nervous/anxious.    All other systems reviewed and are negative.          ECG 12 Lead    Date/Time: 11/9/2020 4:57 PM  Performed by: Brant Barker MD  Authorized by: Brant Barker MD   Comparison: compared with previous ECG from 12/20/2019  Comparison to previous ECG: EKG done today reviewed by me shows sinus rhythm at the rate of 69 bpm with no acute ST-T changes, no new change compared to EKG from 12/20/2019                 Objective:     Physical Exam  /82   Pulse 70    "Ht 182.9 cm (72\")   Wt 101 kg (222 lb)   SpO2 97%   BMI 30.11 kg/m²   General:  Appears in no acute distress  Eyes: Sclera is anicteric,  conjunctiva is clear   HEENT:  No JVD. Thyroid not visibly enlarged. No mucosal pallor or cyanosis  Respiratory: Respirations regular and unlabored at rest.  Bilaterally good breath sounds, with good air entry in all fields. No crackles, rubs or wheezes auscultated  Cardiovascular: S1,S2 Regular rate and rhythm. No murmur, rub or gallop auscultated. No pretibial pitting edema  Gastrointestinal: Abdomen soft, flat, non tender. Bowel sounds present.   Musculoskeletal:  No abnormal movements  Extremities: No digital clubbing or cyanosis  Skin: Color pink. Skin warm and dry to touch. No rashes  No xanthoma  Neuro: Alert and awake, no lateralizing deficits appreciated    Lab Reviewed:                  "

## 2020-12-16 RX ORDER — ISOSORBIDE MONONITRATE 60 MG/1
TABLET, EXTENDED RELEASE ORAL
Qty: 90 TABLET | Refills: 1 | Status: SHIPPED | OUTPATIENT
Start: 2020-12-16 | End: 2021-06-09

## 2021-02-19 ENCOUNTER — TELEPHONE (OUTPATIENT)
Dept: CARDIOLOGY | Facility: CLINIC | Age: 56
End: 2021-02-19

## 2021-02-19 RX ORDER — ATORVASTATIN CALCIUM 40 MG/1
TABLET, FILM COATED ORAL
Qty: 90 TABLET | Refills: 1 | Status: SHIPPED | OUTPATIENT
Start: 2021-02-19 | End: 2021-08-13

## 2021-02-19 NOTE — TELEPHONE ENCOUNTER
PHONE [phone]  [phone]  VENTRAL HERNIA REPAIR 3/11/21  OK TO HOLD  MG 3 DAYS PRIOR?  LOV 11/9/2020

## 2021-06-09 RX ORDER — ISOSORBIDE MONONITRATE 60 MG/1
TABLET, EXTENDED RELEASE ORAL
Qty: 90 TABLET | Refills: 1 | Status: SHIPPED | OUTPATIENT
Start: 2021-06-09 | End: 2021-11-11

## 2021-06-17 RX ORDER — CLOPIDOGREL BISULFATE 75 MG/1
TABLET ORAL
Qty: 90 TABLET | Refills: 3 | Status: SHIPPED | OUTPATIENT
Start: 2021-06-17 | End: 2022-08-10

## 2021-08-13 RX ORDER — ATORVASTATIN CALCIUM 40 MG/1
TABLET, FILM COATED ORAL
Qty: 90 TABLET | Refills: 3 | Status: SHIPPED | OUTPATIENT
Start: 2021-08-13 | End: 2022-08-10

## 2021-08-13 NOTE — TELEPHONE ENCOUNTER
Rx Refill Note  Requested Prescriptions     Pending Prescriptions Disp Refills   • atorvastatin (LIPITOR) 40 MG tablet [Pharmacy Med Name: ATORVASTATIN 40 MG TABLET] 90 tablet 1     Sig: TAKE 1 TABLET BY MOUTH EVERY DAY      Last office visit with prescribing clinician: 11/9/2020      Next office visit with prescribing clinician: 11/11/2021       SCANNED - LABS (06/23/2020)         Silvana Ko MA  08/13/21, 08:25 EDT

## 2021-11-11 ENCOUNTER — OFFICE VISIT (OUTPATIENT)
Dept: CARDIOLOGY | Facility: CLINIC | Age: 56
End: 2021-11-11

## 2021-11-11 VITALS
SYSTOLIC BLOOD PRESSURE: 143 MMHG | BODY MASS INDEX: 30.38 KG/M2 | WEIGHT: 217 LBS | OXYGEN SATURATION: 96 % | HEIGHT: 71 IN | HEART RATE: 75 BPM | DIASTOLIC BLOOD PRESSURE: 84 MMHG

## 2021-11-11 DIAGNOSIS — I73.9 PVD (PERIPHERAL VASCULAR DISEASE) WITH CLAUDICATION (HCC): ICD-10-CM

## 2021-11-11 DIAGNOSIS — I10 ESSENTIAL HYPERTENSION: ICD-10-CM

## 2021-11-11 DIAGNOSIS — I25.10 CAD S/P PERCUTANEOUS CORONARY ANGIOPLASTY: ICD-10-CM

## 2021-11-11 DIAGNOSIS — R06.09 DYSPNEA ON EXERTION: Primary | ICD-10-CM

## 2021-11-11 DIAGNOSIS — E11.9 TYPE 2 DIABETES MELLITUS WITHOUT COMPLICATION, WITHOUT LONG-TERM CURRENT USE OF INSULIN (HCC): ICD-10-CM

## 2021-11-11 DIAGNOSIS — Z98.61 CAD S/P PERCUTANEOUS CORONARY ANGIOPLASTY: ICD-10-CM

## 2021-11-11 DIAGNOSIS — E78.5 DYSLIPIDEMIA: ICD-10-CM

## 2021-11-11 PROCEDURE — 99214 OFFICE O/P EST MOD 30 MIN: CPT | Performed by: INTERNAL MEDICINE

## 2021-11-11 PROCEDURE — 93000 ELECTROCARDIOGRAM COMPLETE: CPT | Performed by: INTERNAL MEDICINE

## 2021-11-11 RX ORDER — AMLODIPINE BESYLATE 5 MG/1
5 TABLET ORAL DAILY
Qty: 90 TABLET | Refills: 3 | Status: SHIPPED | OUTPATIENT
Start: 2021-11-11 | End: 2023-01-05 | Stop reason: SDUPTHER

## 2021-11-11 RX ORDER — ASPIRIN 81 MG/1
81 TABLET ORAL DAILY
Qty: 90 TABLET | Refills: 3 | Status: SHIPPED | OUTPATIENT
Start: 2021-11-11 | End: 2022-12-08

## 2021-11-11 NOTE — PROGRESS NOTES
Subjective:     Encounter Date:11/11/2021      Patient ID: Reid Cartwright is a 55 y.o. male.    Chief Complaint : Complaining of chest pain. Follow-up for CAD, MI, PCI, hypertension, dyslipidemia, diabetes  History of Present Illness        This is a 55-year-old with PMH  of    #  CAD,NSTEMI, cardiac cath 05/03/2018 revealed subtotally occluded RCA which was subjected to drug eluting stent, has normal LVEF.  Cardiac cath done 2/26/2020 revealed patent stent in mid RCA with nonocclusive thrombus in distal RCA and severe disease in large diagonal  # hypertension  # type 2 diabetes  # allergy/intolerance to Phenergan  #   former smoker,  quit 05/2018  # cholecystectomy which was complicated requiring bowel resection  # testicular surgery, right inguinal lymph node resection     here for  follow-up.  Patient is complaining of dyspnea on exertion and chest tightness, denies any   palpitations lightheadedness dizziness loss of consciousness.  Patient reportedly had labs done with PMD. Patient is complaining of left lower extremity greater than right claudication.  Patient had stents done 5/3/2018 and followed up 5/21/2018 has not been compliant with follow-ups due to insurance.Since last labs from 5/4/2018 reveal cholesterol with total cholesterol 136, triglycerides of 65, HDL low at 37, LDL 86.  Labs from 2/25/2020 reveal CMP with a BUN of 21 creatinine 1.23, cholesterol 151 triglycerides 107 HDL low at 38 LDL 92  Patient underwent cardiac cath 2/26/20 which revealed a patent stent in RCA with nonocclusive thrombus in distal RCA and severe diagonal disease.  Labs from 6/23/2021 reveal creatinine of 1.2, glucose of 102, normal CBC, cholesterol 90, triglycerides 72, HDL low at 38, LDL 38, A1c of 6.5.    ASSESSMENT:    #Dyspnea on exertion and chest pain consistent with angina  #  PVD claudication  # CAD,NSTEMI,  PCI  # hypertension  #Dyslipidemia  # diabetes  #Former smoke    PLAN:    Reviewed EKG results with  patient  Patient is complaining of claudication we will check ABIs.  Patient is complaining of headaches with isosorbide will discontinue isosorbide start on amlodipine.  Will decrease aspirin to 81 mg.  We will continue aspirin, amlodipine, lisinopril, clopidogrel to help with angina, claudication, CAD MI PCI, hypertension, dyslipidemia as tolerated  Counseled on smoking cessation, patient says he is quit smoking  Patient's beta-blockers were discontinued because of bradycardia.  Advised patient to follow-up with PMD for diabetes  Counseled on importance of compliance and follow-up  Discussed about COVID-19 vaccination. Patient did not take both. Advised him to take vaccination      ECG 12 Lead    Date/Time: 11/11/2021 4:15 PM  Performed by: Brant Barker MD  Authorized by: Brant Barker MD   Comparison: compared with previous ECG   Comparison to previous ECG: EKG done today reviewed/interpreted by me reveals sinus rhythm at the rate of 76 bpm with no acute ST-T changes, no new change compared to EKG from 11/9/2020              The following portions of the patient's history were reviewed and updated as appropriate: allergies, current medications, past family history, past medical history, past social history, past surgical history and problem list.    Assessment:         MDM     Diagnosis Plan   1. Dyspnea on exertion  Stress Test With Myocardial Perfusion One Day    Doppler Ankle Brachial Index Single Level CAR   2. PVD (peripheral vascular disease) with claudication (HCC)  Stress Test With Myocardial Perfusion One Day    Doppler Ankle Brachial Index Single Level CAR   3. CAD S/P percutaneous coronary angioplasty  Stress Test With Myocardial Perfusion One Day    Doppler Ankle Brachial Index Single Level CAR   4. Essential hypertension  Stress Test With Myocardial Perfusion One Day    Doppler Ankle Brachial Index Single Level CAR   5. Dyslipidemia  Stress Test With Myocardial Perfusion One  Day    Doppler Ankle Brachial Index Single Level CAR   6. Type 2 diabetes mellitus without complication, without long-term current use of insulin (HCC)  Stress Test With Myocardial Perfusion One Day    Doppler Ankle Brachial Index Single Level CAR          Plan:               Past Medical History:  Past Medical History:   Diagnosis Date   • Coronary artery disease    • Diabetes mellitus (HCC)    • Hypertension      Past Surgical History:  Past Surgical History:   Procedure Laterality Date   • CARDIAC CATHETERIZATION     • CARDIAC CATHETERIZATION N/A 2/26/2020    Procedure: Left Heart Cath;  Surgeon: Brant Barker MD;  Location: [facility] CATH INVASIVE LOC[facility];  Service: Cardiovascular;  Laterality: N/A;      Allergies:  Allergies   Allergen Reactions   • Phenergan  [Promethazine Hcl] GI Intolerance     Home Meds:  Current Meds:     Current Outpatient Medications:   •  Arginine 1000 MG tablet, Take 1 tablet by mouth 2 (Two) Times a Day., Disp: , Rfl:   •  atorvastatin (LIPITOR) 40 MG tablet, TAKE 1 TABLET BY MOUTH EVERY DAY, Disp: 90 tablet, Rfl: 3  •  clopidogrel (PLAVIX) 75 MG tablet, TAKE 1 TABLET BY MOUTH EVERY DAY, Disp: 90 tablet, Rfl: 3  •  Coenzyme Q10 (COQ10) 200 MG capsule, Take  by mouth Daily., Disp: , Rfl:   •  lisinopril (PRINIVIL,ZESTRIL) 20 MG tablet, 20 mg Daily., Disp: , Rfl:   •  Magnesium 500 MG capsule, MAGNESIUM 500 MG CAPS, Disp: , Rfl:   •  metFORMIN (GLUCOPHAGE) 1000 MG tablet, 2 (Two) Times a Day With Meals., Disp: , Rfl:   •  amLODIPine (NORVASC) 5 MG tablet, Take 1 tablet by mouth Daily., Disp: 90 tablet, Rfl: 3  •  aspirin (aspirin) 81 MG EC tablet, Take 1 tablet by mouth Daily., Disp: 90 tablet, Rfl: 3  Social History:   Social History     Tobacco Use   • Smoking status: Former Smoker   • Smokeless tobacco: Never Used   Substance Use Topics   • Alcohol use: Not Currently     Comment: rare      Family History:  History reviewed. No pertinent family history.           Review of  "Systems   Cardiovascular: Negative for chest pain, leg swelling and palpitations.   Respiratory: Positive for shortness of breath.    Neurological: Positive for numbness. Negative for dizziness.     All other systems are negative         Objective:     Physical Exam  /84 (BP Location: Left arm, Patient Position: Sitting, Cuff Size: Large Adult)   Pulse 75   Ht 180.3 cm (71\")   Wt 98.4 kg (217 lb)   SpO2 96%   BMI 30.27 kg/m²   General:  Appears in no acute distress  Eyes: Sclera is anicteric,  conjunctiva is clear   HEENT:  No JVD.  No carotid bruits  Respiratory: Respirations regular and unlabored at rest.  Clear to auscultation  Cardiovascular: S1,S2 Regular rate and rhythm. No murmur, rub or gallop auscultated.   Extremities: No digital clubbing or cyanosis, no edema  Skin: Color pink. Skin warm and dry to touch. No rashes  No xanthoma  Neuro: Alert and awake, no lateralizing deficits appreciated    Lab Reviewed:         Brant Barker MD  11/11/2021 16:15 EST      Much of the above report is an electronic transcription/translation of the spoken language to printed text using Dragon Software. As such, the subtleties and finesse of the spoken language may permit erroneous, or at times, nonsensical words or phrases to be inadvertently transcribed; thus changes may be made at a later date to rectify these errors.         "

## 2021-11-22 ENCOUNTER — HOSPITAL ENCOUNTER (OUTPATIENT)
Dept: CARDIOLOGY | Facility: HOSPITAL | Age: 56
Discharge: HOME | End: 2021-11-22

## 2021-11-22 ENCOUNTER — HOSPITAL ENCOUNTER (OUTPATIENT)
Dept: CARDIOLOGY | Facility: HOSPITAL | Age: 56
Discharge: HOME | End: 2021-11-22
Admitting: INTERNAL MEDICINE

## 2021-11-22 DIAGNOSIS — I25.10 CAD S/P PERCUTANEOUS CORONARY ANGIOPLASTY: ICD-10-CM

## 2021-11-22 DIAGNOSIS — E11.9 TYPE 2 DIABETES MELLITUS WITHOUT COMPLICATION, WITHOUT LONG-TERM CURRENT USE OF INSULIN (HCC): ICD-10-CM

## 2021-11-22 DIAGNOSIS — R06.09 DYSPNEA ON EXERTION: ICD-10-CM

## 2021-11-22 DIAGNOSIS — Z98.61 CAD S/P PERCUTANEOUS CORONARY ANGIOPLASTY: ICD-10-CM

## 2021-11-22 DIAGNOSIS — I73.9 PVD (PERIPHERAL VASCULAR DISEASE) WITH CLAUDICATION (HCC): ICD-10-CM

## 2021-11-22 DIAGNOSIS — I10 ESSENTIAL HYPERTENSION: ICD-10-CM

## 2021-11-22 DIAGNOSIS — E78.5 DYSLIPIDEMIA: ICD-10-CM

## 2021-11-22 LAB
BH CV LOWER ARTERIAL LEFT ABI RATIO: 1.19
BH CV LOWER ARTERIAL LEFT DORSALIS PEDIS SYS MAX: 147 MMHG
BH CV LOWER ARTERIAL LEFT GREAT TOE SYS MAX: 186 MMHG
BH CV LOWER ARTERIAL LEFT POST TIBIAL SYS MAX: 166 MMHG
BH CV LOWER ARTERIAL LEFT TBI RATIO: 1.34
BH CV LOWER ARTERIAL RIGHT ABI RATIO: 1.19
BH CV LOWER ARTERIAL RIGHT DORSALIS PEDIS SYS MAX: 160 MMHG
BH CV LOWER ARTERIAL RIGHT GREAT TOE SYS MAX: 139 MMHG
BH CV LOWER ARTERIAL RIGHT POST TIBIAL SYS MAX: 165 MMHG
BH CV LOWER ARTERIAL RIGHT TBI RATIO: 1
BH CV REST NUCLEAR ISOTOPE DOSE: 10.9 MCI
BH CV STRESS BP STAGE 1: NORMAL
BH CV STRESS COMMENTS STAGE 1: NORMAL
BH CV STRESS DOSE REGADENOSON STAGE 1: 0.4
BH CV STRESS DURATION MIN STAGE 1: 0
BH CV STRESS DURATION SEC STAGE 1: 10
BH CV STRESS HR STAGE 1: 89
BH CV STRESS NUCLEAR ISOTOPE DOSE: 31.8 MCI
BH CV STRESS PROTOCOL 1: NORMAL
BH CV STRESS RECOVERY BP: NORMAL MMHG
BH CV STRESS RECOVERY HR: 80 BPM
BH CV STRESS STAGE 1: 1
MAXIMAL PREDICTED HEART RATE: 165 BPM
MAXIMAL PREDICTED HEART RATE: 165 BPM
PERCENT MAX PREDICTED HR: 53.94 %
STRESS BASELINE BP: NORMAL MMHG
STRESS BASELINE HR: 55 BPM
STRESS PERCENT HR: 63 %
STRESS POST PEAK BP: NORMAL MMHG
STRESS POST PEAK HR: 89 BPM
STRESS TARGET HR: 140 BPM
STRESS TARGET HR: 140 BPM
UPPER ARTERIAL LEFT ARM BRACHIAL SYS MAX: 139 MMHG
UPPER ARTERIAL RIGHT ARM BRACHIAL SYS MAX: 138 MMHG

## 2021-11-22 PROCEDURE — 93018 CV STRESS TEST I&R ONLY: CPT | Performed by: INTERNAL MEDICINE

## 2021-11-22 PROCEDURE — 78452 HT MUSCLE IMAGE SPECT MULT: CPT | Performed by: INTERNAL MEDICINE

## 2021-11-22 PROCEDURE — A9500 TC99M SESTAMIBI: HCPCS | Performed by: INTERNAL MEDICINE

## 2021-11-22 PROCEDURE — 0 TECHNETIUM SESTAMIBI: Performed by: INTERNAL MEDICINE

## 2021-11-22 PROCEDURE — 78452 HT MUSCLE IMAGE SPECT MULT: CPT

## 2021-11-22 PROCEDURE — 93017 CV STRESS TEST TRACING ONLY: CPT

## 2021-11-22 PROCEDURE — 25010000002 REGADENOSON 0.4 MG/5ML SOLUTION: Performed by: INTERNAL MEDICINE

## 2021-11-22 PROCEDURE — 93922 UPR/L XTREMITY ART 2 LEVELS: CPT

## 2021-11-22 RX ADMIN — TECHNETIUM TC 99M SESTAMIBI 1 DOSE: 1 INJECTION INTRAVENOUS at 10:40

## 2021-11-22 RX ADMIN — TECHNETIUM TC 99M SESTAMIBI 1 DOSE: 1 INJECTION INTRAVENOUS at 08:27

## 2021-11-22 RX ADMIN — REGADENOSON 0.4 MG: 0.08 INJECTION, SOLUTION INTRAVENOUS at 10:40

## 2021-11-23 ENCOUNTER — TELEPHONE (OUTPATIENT)
Dept: CARDIOLOGY | Facility: CLINIC | Age: 56
End: 2021-11-23

## 2022-08-10 RX ORDER — CLOPIDOGREL BISULFATE 75 MG/1
TABLET ORAL
Qty: 90 TABLET | Refills: 0 | Status: SHIPPED | OUTPATIENT
Start: 2022-08-10

## 2022-08-10 RX ORDER — ATORVASTATIN CALCIUM 40 MG/1
TABLET, FILM COATED ORAL
Qty: 90 TABLET | Refills: 0 | Status: SHIPPED | OUTPATIENT
Start: 2022-08-10

## 2022-08-10 NOTE — TELEPHONE ENCOUNTER
Rx Refill Note  Requested Prescriptions     Pending Prescriptions Disp Refills   • clopidogrel (PLAVIX) 75 MG tablet [Pharmacy Med Name: CLOPIDOGREL 75 MG TABLET] 90 tablet 3     Sig: TAKE 1 TABLET BY MOUTH EVERY DAY   • atorvastatin (LIPITOR) 40 MG tablet [Pharmacy Med Name: ATORVASTATIN 40 MG TABLET] 90 tablet 3     Sig: TAKE 1 TABLET BY MOUTH EVERY DAY      Last office visit with prescribing clinician: 11/11/2021      Next office visit with prescribing clinician: 11/14/2022            Juana Montes MA  08/10/22, 09:14 EDT

## 2022-12-08 RX ORDER — ASPIRIN 81 MG/1
TABLET, COATED ORAL
Qty: 90 TABLET | Refills: 3 | Status: SHIPPED | OUTPATIENT
Start: 2022-12-08

## 2022-12-08 NOTE — TELEPHONE ENCOUNTER
Rx Refill Note  Requested Prescriptions     Pending Prescriptions Disp Refills   • Aspirin Low Dose 81 MG EC tablet [Pharmacy Med Name: ASPIRIN EC 81 MG TABLET] 30 tablet 11     Sig: TAKE 1 TABLET BY MOUTH EVERY DAY      Last office visit with prescribing clinician: 11/11/2021   Last telemedicine visit with prescribing clinician: 1/5/2023   Next office visit with prescribing clinician: 1/5/2023                         Would you like a call back once the refill request has been completed: [] Yes [] No    If the office needs to give you a call back, can they leave a voicemail: [] Yes [] No    Juana Montes MA  12/08/22, 08:48 EST

## 2023-01-05 ENCOUNTER — OFFICE VISIT (OUTPATIENT)
Dept: CARDIOLOGY | Facility: CLINIC | Age: 58
End: 2023-01-05
Payer: MEDICAID

## 2023-01-05 VITALS
WEIGHT: 208 LBS | OXYGEN SATURATION: 97 % | BODY MASS INDEX: 28.17 KG/M2 | DIASTOLIC BLOOD PRESSURE: 82 MMHG | HEART RATE: 67 BPM | SYSTOLIC BLOOD PRESSURE: 146 MMHG | HEIGHT: 72 IN

## 2023-01-05 DIAGNOSIS — E78.5 DYSLIPIDEMIA: ICD-10-CM

## 2023-01-05 DIAGNOSIS — I73.9 PVD (PERIPHERAL VASCULAR DISEASE) WITH CLAUDICATION: ICD-10-CM

## 2023-01-05 DIAGNOSIS — I25.10 CAD S/P PERCUTANEOUS CORONARY ANGIOPLASTY: ICD-10-CM

## 2023-01-05 DIAGNOSIS — I10 ESSENTIAL HYPERTENSION: Primary | ICD-10-CM

## 2023-01-05 DIAGNOSIS — Z98.61 CAD S/P PERCUTANEOUS CORONARY ANGIOPLASTY: ICD-10-CM

## 2023-01-05 DIAGNOSIS — E11.9 TYPE 2 DIABETES MELLITUS WITHOUT COMPLICATION, WITHOUT LONG-TERM CURRENT USE OF INSULIN: ICD-10-CM

## 2023-01-05 PROCEDURE — 93000 ELECTROCARDIOGRAM COMPLETE: CPT | Performed by: INTERNAL MEDICINE

## 2023-01-05 PROCEDURE — 99214 OFFICE O/P EST MOD 30 MIN: CPT | Performed by: INTERNAL MEDICINE

## 2023-01-05 RX ORDER — AMLODIPINE BESYLATE 10 MG/1
10 TABLET ORAL DAILY
Qty: 90 TABLET | Refills: 3 | Status: SHIPPED | OUTPATIENT
Start: 2023-01-05

## 2023-01-05 NOTE — PROGRESS NOTES
Subjective:     Encounter Date:01/05/2023      Patient ID: Reid Cartwright is a 57 y.o. male.    Chief Complaint : Follow-up for CAD, MI, PCI, hypertension, dyslipidemia, diabetes    History of Present Illness      Mr. Reid Cartwright has PMH  of    #  CAD,NSTEMI, cardiac cath 05/03/2018 revealed subtotally occluded RCA which was subjected to drug eluting stent, has normal LVEF.  Cardiac cath done 2/26/2020 revealed patent stent in mid RCA with nonocclusive thrombus in distal RCA and severe disease in large diagonal  # hypertension  # type 2 diabetes  # allergy/intolerance to Phenergan  #   former smoker,  quit 05/2018  # cholecystectomy which was complicated requiring bowel resection  # testicular surgery, right inguinal lymph node resection     here for  follow-up.  Patient has some dyspnea on exertion denies any chest pain.    Patient's arterial blood pressure is 146/82, heart rate 67, O2 sat of 97% on room air    Patient had stents done 5/3/2018 and followed up 5/21/2018 has not been compliant with follow-ups due to insurance.Since last labs from 5/4/2018 reveal cholesterol with total cholesterol 136, triglycerides of 65, HDL low at 37, LDL 86.  Labs from 2/25/2020 reveal CMP with a BUN of 21 creatinine 1.23, cholesterol 151 triglycerides 107 HDL low at 38 LDL 92  Patient underwent cardiac cath 2/26/20 which revealed a patent stent in RCA with nonocclusive thrombus in distal RCA and severe diagonal disease.  Labs from 6/23/2021 reveal creatinine of 1.2, glucose of 102, normal CBC, cholesterol 90, triglycerides 72, HDL low at 38, LDL 38, A1c of 6.5.    ASSESSMENT:    #Hypertension  #PAD  # CAD,NSTEMI,  PCI  #Dyslipidemia  # diabetes  #Former smoker    PLAN:     Reviewed EKG results with patient  Patient blood pressure is elevated will increase amlodipine to 10 mg and follow-up with nurse practitioner Zarina Lewis to recheck blood pressure and labs.  We will continue aspirin, amlodipine, lisinopril, clopidogrel to  help with angina, claudication, CAD MI PCI, hypertension, dyslipidemia as tolerated  Counseled on smoking cessation, patient says he is quit smoking  Patient's beta-blockers were discontinued because of bradycardia.  Advised patient to follow-up with PMD for diabetes  Counseled on importance of compliance and follow-up                 ECG 12 Lead    Date/Time: 1/5/2023 4:07 PM  Performed by: Brant Barker MD  Authorized by: Brant Barker MD   Comparison: compared with previous ECG from 11/11/2021  Comparison to previous ECG: EKG done today reviewed/interpreted by me reveals sinus rhythm with rate of 61 bpm, minimal ST elevation consistent with early repolarization, no new change compared EKG from 11/11/2021              Copied text in this portion of the note has been reviewed and is accurate as of 1/5/2023  The following portions of the patient's history were reviewed and updated as appropriate: allergies, current medications, past family history, past medical history, past social history, past surgical history and problem list.    Assessment:         MDM     Diagnosis Plan   1. Essential hypertension  Comprehensive Metabolic Panel    Lipid Panel      2. CAD S/P percutaneous coronary angioplasty  Comprehensive Metabolic Panel    Lipid Panel      3. PVD (peripheral vascular disease) with claudication (HCC)  Comprehensive Metabolic Panel    Lipid Panel      4. Dyslipidemia  Comprehensive Metabolic Panel    Lipid Panel      5. Type 2 diabetes mellitus without complication, without long-term current use of insulin (HCC)  Comprehensive Metabolic Panel    Lipid Panel             Plan:               Past Medical History:  Past Medical History:   Diagnosis Date   • Coronary artery disease    • Diabetes mellitus (HCC)    • Hypertension      Past Surgical History:  Past Surgical History:   Procedure Laterality Date   • CARDIAC CATHETERIZATION     • CARDIAC CATHETERIZATION N/A 02/26/2020    Procedure:  Left Heart Cath;  Surgeon: Brant Barker MD;  Location: [facility] CATH INVASIVE LOCATION;  Service: Cardiovascular;  Laterality: N/A;   • CHOLECYSTECTOMY     • COLON RESECTION     • COLON SURGERY        Allergies:  Allergies   Allergen Reactions   • Phenergan  [Promethazine Hcl] GI Intolerance     Home Meds:  Current Meds:     Current Outpatient Medications:   •  amLODIPine (NORVASC) 10 MG tablet, Take 1 tablet by mouth Daily., Disp: 90 tablet, Rfl: 3  •  Arginine 1000 MG tablet, Take 1 tablet by mouth 2 (Two) Times a Day., Disp: , Rfl:   •  Aspirin Low Dose 81 MG EC tablet, TAKE 1 TABLET BY MOUTH EVERY DAY, Disp: 90 tablet, Rfl: 3  •  atorvastatin (LIPITOR) 40 MG tablet, TAKE 1 TABLET BY MOUTH EVERY DAY, Disp: 90 tablet, Rfl: 0  •  clopidogrel (PLAVIX) 75 MG tablet, TAKE 1 TABLET BY MOUTH EVERY DAY, Disp: 90 tablet, Rfl: 0  •  lisinopril (PRINIVIL,ZESTRIL) 20 MG tablet, 20 mg Daily., Disp: , Rfl:   •  Magnesium 500 MG capsule, MAGNESIUM 500 MG CAPS, Disp: , Rfl:   •  metFORMIN (GLUCOPHAGE) 1000 MG tablet, 2 (Two) Times a Day With Meals., Disp: , Rfl:   Social History:   Social History     Tobacco Use   • Smoking status: Former   • Smokeless tobacco: Never   Substance Use Topics   • Alcohol use: Not Currently     Comment: rare      Family History:  History reviewed. No pertinent family history.           Review of Systems   Constitutional: Negative for malaise/fatigue.   Cardiovascular: Negative for chest pain, leg swelling and palpitations.   Respiratory: Positive for shortness of breath.    Skin: Negative for rash.   Neurological: Positive for dizziness and light-headedness. Negative for numbness.     All other systems are negative         Objective:     Physical Exam  /82   Pulse 67   Ht 182.9 cm (72\")   Wt 94.3 kg (208 lb)   SpO2 97%   BMI 28.21 kg/m²   General:  Appears in no acute distress  Eyes: Sclera is anicteric,  conjunctiva is clear   HEENT:  No JVD.  No carotid bruits  Respiratory:  Respirations regular and unlabored at rest.  Clear to auscultation  Cardiovascular: S1,S2 Regular rate and rhythm. No murmur, rub or gallop auscultated.   Extremities: No digital clubbing or cyanosis, no edema  Skin: Color pink. Skin warm and dry to touch. No rashes  No xanthoma  Neuro: Alert and awake.    Lab Reviewed:         Brant Barker MD  1/5/2023 16:12 EST      EMR Dragon/Transcription:   \"Dictated utilizing Dragon dictation\".

## 2023-12-13 ENCOUNTER — TELEPHONE (OUTPATIENT)
Dept: CARDIOLOGY | Facility: CLINIC | Age: 58
End: 2023-12-13
Payer: MEDICAID

## (undated) DEVICE — CATH DIAG IMPULSE PIG .056 6F 110CM

## (undated) DEVICE — PK TRY HEART CATH 50

## (undated) DEVICE — CATH DIAG IMPULSE FR4 6F 100CM

## (undated) DEVICE — GW DIAG EMERALD HEPCOAT MOVE JTIP STD .035 3MM 150CM

## (undated) DEVICE — PINNACLE INTRODUCER SHEATH: Brand: PINNACLE

## (undated) DEVICE — MYNXGRIP 6F/7F: Brand: MYNXGRIP

## (undated) DEVICE — CATH DIAG IMPULSE FL4 6F 100CM